# Patient Record
Sex: MALE | Race: WHITE
[De-identification: names, ages, dates, MRNs, and addresses within clinical notes are randomized per-mention and may not be internally consistent; named-entity substitution may affect disease eponyms.]

---

## 2019-01-18 ENCOUNTER — HOSPITAL ENCOUNTER (OUTPATIENT)
Dept: HOSPITAL 35 - TBA | Age: 44
Discharge: HOME | End: 2019-01-18
Attending: ORTHOPAEDIC SURGERY
Payer: COMMERCIAL

## 2019-01-18 VITALS — WEIGHT: 274 LBS | HEIGHT: 67.01 IN | BODY MASS INDEX: 43 KG/M2

## 2019-01-18 VITALS — SYSTOLIC BLOOD PRESSURE: 134 MMHG | DIASTOLIC BLOOD PRESSURE: 74 MMHG

## 2019-01-18 DIAGNOSIS — Z98.890: ICD-10-CM

## 2019-01-18 DIAGNOSIS — M20.21: Primary | ICD-10-CM

## 2019-01-18 DIAGNOSIS — I10: ICD-10-CM

## 2019-01-18 DIAGNOSIS — F17.210: ICD-10-CM

## 2019-01-18 DIAGNOSIS — Z90.49: ICD-10-CM

## 2019-01-18 DIAGNOSIS — Z88.0: ICD-10-CM

## 2019-01-18 DIAGNOSIS — E11.9: ICD-10-CM

## 2019-01-18 DIAGNOSIS — Z79.899: ICD-10-CM

## 2019-01-18 PROCEDURE — 56526: CPT

## 2019-01-18 PROCEDURE — 62900: CPT

## 2019-01-18 PROCEDURE — 50101: CPT

## 2019-01-18 PROCEDURE — 50951 ENDOSCOPY OF URETER: CPT

## 2019-01-18 PROCEDURE — 51412: CPT

## 2019-01-18 PROCEDURE — 57091: CPT

## 2019-01-18 PROCEDURE — 62110: CPT

## 2019-01-18 PROCEDURE — 70005: CPT

## 2019-01-18 PROCEDURE — 50010 RENAL EXPLORATION: CPT

## 2019-01-18 PROCEDURE — 50386 REMOVE STENT VIA TRANSURETH: CPT

## 2019-01-18 NOTE — EKG
[address]  [address]
Phone:  [phone]                    ELECTROCARDIOGRAM REPORT      
_______________________________________________________________________________
 
Name:       JASWANT MEDINA               Room #:         150-3       [facility]#:      [id number]     Account #:      [id number]  
Admission:  19    Attend Phys:    Henry Delarosa MD
Discharge:              Date of Birth:  [birth year]  
                                                          Report #: [id number]
   [id number]
_______________________________________________________________________________
THIS REPORT FOR:   //name//                          
 
                          [facility]
                                       
Test Date:    2019               Test Time:    11:26:24
Pat Name:     JASWANT MEDINA            Department:   
Patient ID:   [id number]            Room:         150 3
Gender:       M                        Technician:   ANDRIA
:          1975               Requested By: Henry Delarosa
Order Number: [id number] MD:   Claude Cary
                                 Measurements
Intervals                              Axis          
Rate:         80                       P:            42
MT:           145                      QRS:          2
QRSD:         88                       T:            4
QT:           399                                    
QTc:          461                                    
                           Interpretive Statements
Sinus rhythm
No previous ECG available for comparison
 
Electronically Signed On 2019 13:41:04 CST by Claude Cary
[email or web address]
 
 
 
 
 
 
 
 
 
 
 
 
 
 
 
 
 
 
 
 
  <ELECTRONICALLY SIGNED>
   By: Claude Cary MD        
  19     1341
D: 19 1126                           _____________________________________
T: 19 1126                           Claude Cary MD          /NILAM

## 2019-01-18 NOTE — O
[facility]
[address] [place]   [address]                     OPERATIVE REPORT              
_______________________________________________________________________________
 
Name:       JASWANT MEDINA               Room #:         150-3       [facility].#:      [id number]                       Account #:      [id number]  
Admission:  01/18/19    Attend Phys:    Henry Delarosa MD
Discharge:              Date of Birth:  [birth year]  
                                                          Report #: [id number]
                                                                    [id number]   
_______________________________________________________________________________
THIS REPORT FOR:   //name//                          
 
[facility]
 
DATE OF SERVICE:  01/18/2019
 
 
PREOPERATIVE DIAGNOSIS:  Right hallux rigidus.
 
POSTOPERATIVE DIAGNOSIS:  Right hallux rigidus.
 
PROCEDURE:  Right great toe cheilectomy.
 
SURGEON:  Henry Delarosa M.D.
 
FIRST ASSISTANT:  Beverly Cedeno.
 
ANESTHESIA:  General.
 
ESTIMATED BLOOD LOSS:  Minimal.
 
DRAINS:  None.
 
TOURNIQUET TIME:  30 minutes.
 
DESCRIPTION OF PROCEDURE:  The patient brought to the operating room where he
was placed under general anesthesia.  Once under adequate general anesthesia,
his right lower extremity was prepped and draped in sterile manner.  The
extremity was elevated, exsanguinated, tourniquet placed to 300 mmHg.  A dorsal
incision over the first metatarsophalangeal joint was made.  This was dissected
down through soft tissue to the joint capsule, which was incised medially over
the joint.  Exposure was made in the dorsal osteophytes at the
metatarsophalangeal joint and a sagittal saw was then utilized to resect the
dorsal osteophyte at the first metatarsal head.  Any impinging areas were
removed with a rongeur.  The wound was irrigated copiously and closed with 2-0
Vicryl in the capsular layer, 2-0 Vicryl in subcutaneous tissues and staples
were used for the skin.  The wounds were dressed with Xeroform, 4 x 4s, and a
sterile soft compressive dressing was placed.  It should be noted that during
the procedure, the patient's toe was able to be dorsiflexed to 90 degrees.  The
wound was dressed with Xeroform, 4 x 4s, and sterile soft and compressive
dressing was placed.  Tourniquet was let down at 30 minutes.  Toes were pink and
warm with good capillary refill.  There were no complications from the
 
 
 
[address]   [address]                     OPERATIVE REPORT              
_______________________________________________________________________________
 
Name:       CAROLJASWANT ZOHRA               Room #:         150-3       [facility].#:      [id number]                       Account #:      [id number]  
Admission:  01/18/19    Attend Phys:    Henry Delarosa MD
Discharge:              Date of Birth:  [birth year]  
                                                          Report #: [id number]
                                                                    [id number]   
_______________________________________________________________________________
procedure.  The patient tolerated the procedure well and went to the recovery
room without incident.
 
 
 
 
 
 
 
 
 
 
 
 
 
 
 
 
 
 
 
 
 
 
 
 
 
 
 
 
 
 
 
 
 
 
 
 
 
 
 
 
 
 
                         
   By:                               
                   
D: 01/18/19 1416                           _____________________________________
T: 01/18/19 1424                           Henry Delarosa MD          /nt